# Patient Record
Sex: MALE | Race: AMERICAN INDIAN OR ALASKA NATIVE
[De-identification: names, ages, dates, MRNs, and addresses within clinical notes are randomized per-mention and may not be internally consistent; named-entity substitution may affect disease eponyms.]

---

## 2019-10-02 ENCOUNTER — HOSPITAL ENCOUNTER (EMERGENCY)
Dept: HOSPITAL 5 - ED | Age: 34
Discharge: HOME | End: 2019-10-02
Payer: SELF-PAY

## 2019-10-02 DIAGNOSIS — Z98.890: ICD-10-CM

## 2019-10-02 DIAGNOSIS — K02.9: Primary | ICD-10-CM

## 2019-10-02 DIAGNOSIS — Z79.899: ICD-10-CM

## 2019-10-02 DIAGNOSIS — Z79.1: ICD-10-CM

## 2019-10-02 DIAGNOSIS — F17.200: ICD-10-CM

## 2019-10-02 PROCEDURE — 99282 EMERGENCY DEPT VISIT SF MDM: CPT

## 2019-10-02 NOTE — EMERGENCY DEPARTMENT REPORT
ED ENT HPI





- General


Chief complaint: Dental/Oral


Stated complaint: TOOTHACHE


Time Seen by Provider: 10/02/19 03:47


Source: patient


Mode of arrival: Ambulatory


Limitations: No Limitations





- History of Present Illness


Initial comments: 





Patient is a 34-year-old male presents to emergency room with complaints of left

upper dental pain that began last night.  Patient states that he has not seen a 

dentist in approximately 3 years.  Patient denies any fever, facial swelling, 

nausea, vomiting.  Tolerating by mouth intake and secretions.  Patient denies 

any past medical history or allergies to medications.





- Related Data


                                  Previous Rx's











 Medication  Instructions  Recorded  Last Taken  Type


 


Ciprofloxacin HCl [Ciprofloxacin 500 mg PO Q12H #14 tab 08/27/15 Unknown Rx





TAB]    


 


Phenazopyridine [Pyridium] 200 mg PO TID #6 tab 08/27/15 Unknown Rx


 


Acetaminophen/Codeine [Tylenol #3] 1 tab PO Q6H PRN #14 tab 03/05/17 Unknown Rx


 


Amoxicillin/K Clav Tab [Augmentin 1 tab PO Q12HR #10 tab 03/05/17 Unknown Rx





875 mg]    


 


Ibuprofen [Motrin 600 MG tab] 600 mg PO Q8H PRN #30 tablet 03/05/17 Unknown Rx


 


Ibuprofen [Motrin 600 MG tab] 600 mg PO Q8H PRN #14 tablet 10/02/19 Unknown Rx


 


Penicillin Vk [Veetids TAB] 500 mg PO QID 7 Days #56 tablet 10/02/19 Unknown Rx











                                    Allergies











Allergy/AdvReac Type Severity Reaction Status Date / Time


 


No Known Allergies Allergy   Verified 03/05/17 05:49














ED Dental HPI





- General


Chief complaint: Dental/Oral


Stated complaint: TOOTHACHE


Time Seen by Provider: 10/02/19 03:47


Source: patient


Mode of arrival: Ambulatory


Limitations: No Limitations





- Related Data


                                  Previous Rx's











 Medication  Instructions  Recorded  Last Taken  Type


 


Ciprofloxacin HCl [Ciprofloxacin 500 mg PO Q12H #14 tab 08/27/15 Unknown Rx





TAB]    


 


Phenazopyridine [Pyridium] 200 mg PO TID #6 tab 08/27/15 Unknown Rx


 


Acetaminophen/Codeine [Tylenol #3] 1 tab PO Q6H PRN #14 tab 03/05/17 Unknown Rx


 


Amoxicillin/K Clav Tab [Augmentin 1 tab PO Q12HR #10 tab 03/05/17 Unknown Rx





875 mg]    


 


Ibuprofen [Motrin 600 MG tab] 600 mg PO Q8H PRN #30 tablet 03/05/17 Unknown Rx


 


Ibuprofen [Motrin 600 MG tab] 600 mg PO Q8H PRN #14 tablet 10/02/19 Unknown Rx


 


Penicillin Vk [Veetids TAB] 500 mg PO QID 7 Days #56 tablet 10/02/19 Unknown Rx











                                    Allergies











Allergy/AdvReac Type Severity Reaction Status Date / Time


 


No Known Allergies Allergy   Verified 03/05/17 05:49














ED Review of Systems


ROS: 


Stated complaint: TOOTHACHE


Other details as noted in HPI





Comment: All other systems reviewed and negative





ED Past Medical Hx





- Past Medical History


Previous Medical History?: No





- Surgical History


Past Surgical History?: No


Additional Surgical History: LOWER JAW SURGERY.  LEFT ANKLE SURGERY.  

TONSILLECTOMY





- Social History


Smoking Status: Current Every Day Smoker


Substance Use Type: None





- Medications


Home Medications: 


                                Home Medications











 Medication  Instructions  Recorded  Confirmed  Last Taken  Type


 


Ciprofloxacin HCl [Ciprofloxacin 500 mg PO Q12H #14 tab 08/27/15  Unknown Rx





TAB]     


 


Phenazopyridine [Pyridium] 200 mg PO TID #6 tab 08/27/15  Unknown Rx


 


Acetaminophen/Codeine [Tylenol #3] 1 tab PO Q6H PRN #14 tab 03/05/17  Unknown Rx


 


Amoxicillin/K Clav Tab [Augmentin 1 tab PO Q12HR #10 tab 03/05/17  Unknown Rx





875 mg]     


 


Ibuprofen [Motrin 600 MG tab] 600 mg PO Q8H PRN #30 tablet 03/05/17  Unknown Rx


 


Ibuprofen [Motrin 600 MG tab] 600 mg PO Q8H PRN #14 tablet 10/02/19  Unknown Rx


 


Penicillin Vk [Veetids TAB] 500 mg PO QID 7 Days #56 tablet 10/02/19  Unknown Rx














ED Physical Exam





- General


Limitations: No Limitations


General appearance: alert, in no apparent distress





- Head


Head exam: Present: atraumatic, normocephalic





- Eye


Eye exam: Present: normal appearance





- ENT


ENT exam: Present: normal orophraynx, mucous membranes moist, other (cracked 

tooth present to the left upper side and left lower side, no edema of the gum, 

no necrosis, no facial edema, uvula is midline, no uvular edema)





- Neurological Exam


Neurological exam: Present: alert, oriented X3





- Psychiatric


Psychiatric exam: Present: normal affect, normal mood





- Skin


Skin exam: Present: warm, dry, intact





ED Course


                                   Vital Signs











  10/02/19





  06:20


 


Pulse Rate 72


 


O2 Sat by Pulse 100





Oximetry 














ED Medical Decision Making





- Medical Decision Making





Patient is a 34-year-old male presents to emergency room with complaints of left

upper dental pain that began last night.  Patient states that he has not seen a 

dentist in approximately 3 years.  Patient denies any fever, facial swelling, 

nausea, vomiting.  Tolerating by mouth intake and secretions.  Patient denies 

any past medical history or allergies to medications. on exam: cracked tooth 

present to the left upper side and left lower side, no edema of the gum, no 

necrosis, no facial edema, uvula is midline, no uvular edema,no signs of dental 

abscess, no facial cellulitis. pt states that "the dentist wont pull the tooth 

without being on antibiotics." vitals are stable on triage paperwork, no 

tachycardia, afebrile. Patient given prescription for Penicillin VK and 

ibuprofen. advised pt to please take medication as prescribed. follow up with a 

dentist in the next 2-3 days. return to the emergency room for any new or 

worsening symptoms.  discussed with pt that he needs to see a dentist for 

permanent solution.


Critical care attestation.: 


If time is entered above; I have spent that time in minutes in the direct care 

of this critically ill patient, excluding procedure time.








ED Disposition


Clinical Impression: 


 Toothache, Dental caries





Disposition: DC-01 TO HOME OR SELFCARE


Is pt being admited?: No


Does the pt Need Aspirin: No


Condition: Stable


Instructions:  Dental Caries (ED), Toothache (ED)


Additional Instructions: 


please take medication as prescribed. follow up with a dentist in the next 2-3 

days. return to the emergency room for any new or worsening symptoms. 


Prescriptions: 


Ibuprofen [Motrin 600 MG tab] 600 mg PO Q8H PRN #14 tablet


 PRN Reason: Pain


Penicillin Vk [Veetids TAB] 500 mg PO QID 7 Days #56 tablet


Referrals: 


[facility] [Outside] - 2-3 Days


Time of Disposition: 04:33


Print Language: ENGLISH